# Patient Record
Sex: FEMALE | Race: BLACK OR AFRICAN AMERICAN | Employment: UNEMPLOYED | URBAN - METROPOLITAN AREA
[De-identification: names, ages, dates, MRNs, and addresses within clinical notes are randomized per-mention and may not be internally consistent; named-entity substitution may affect disease eponyms.]

---

## 2023-02-10 ENCOUNTER — CLINICAL SUPPORT (OUTPATIENT)
Dept: SMOKING CESSATION | Facility: CLINIC | Age: 58
End: 2023-02-10
Payer: MEDICAID

## 2023-02-10 DIAGNOSIS — F17.200 NICOTINE DEPENDENCE: Primary | ICD-10-CM

## 2023-02-10 PROCEDURE — 99404 PREV MED CNSL INDIV APPRX 60: CPT | Mod: S$GLB,,,

## 2023-02-10 PROCEDURE — 99404 PR PREVENT COUNSEL,INDIV,60 MIN: ICD-10-PCS | Mod: S$GLB,,,

## 2023-02-10 NOTE — PROGRESS NOTES
Patient smokes 1 ppd in her car, not her home.  She wants to be healthy and not smoke; no health issues currently. She will use 21mg patches and 2 mg lozenges.  She smoke in the middle of the night.

## 2023-02-17 ENCOUNTER — CLINICAL SUPPORT (OUTPATIENT)
Dept: SMOKING CESSATION | Facility: CLINIC | Age: 58
End: 2023-02-17
Payer: MEDICAID

## 2023-02-17 DIAGNOSIS — F17.200 NICOTINE DEPENDENCE: Primary | ICD-10-CM

## 2023-02-17 PROCEDURE — 99404 PR PREVENT COUNSEL,INDIV,60 MIN: ICD-10-PCS | Mod: S$GLB,,,

## 2023-02-17 PROCEDURE — 99404 PREV MED CNSL INDIV APPRX 60: CPT | Mod: S$GLB,,,

## 2023-02-17 RX ORDER — DM/P-EPHED/ACETAMINOPH/DOXYLAM 30-7.5/3
2 LIQUID (ML) ORAL
Qty: 168 LOZENGE | Refills: 0 | Status: SHIPPED | OUTPATIENT
Start: 2023-02-17

## 2023-02-17 RX ORDER — IBUPROFEN 200 MG
1 TABLET ORAL DAILY
Qty: 1 PATCH | Refills: 0 | Status: SHIPPED | OUTPATIENT
Start: 2023-02-17

## 2023-02-17 NOTE — PROGRESS NOTES
Individual Follow-Up Form    2/17/2023    Quit Date: TBD    Clinical Status of Patient: Outpatient    Length of Service: 60 minutes    Continuing Medication: yes  Patches or Nicotine Lozenges    Other Medications: none     Target Symptoms: Withdrawal and medication side effects. The following were  rated moderate (3) to severe (4) on TCRS:  Moderate (3): none  Severe (4): none    Comments: Pt is down to 8 cpd and feeling optimistic about quitting.  We dicussed concerns she had about fully becoming a non smoker. I commended her on doing so well in short amount of time.  The patient will continue with  therapy sessions and medication monitoring by CTTS. Prescribed medication management will be by physician.  completion of TCRS (Tobacco Cessation Rating Scale) reviewed strategies, cues, and triggers. Introduced the negative impact of tobacco on health, the health advantages of discontinuing the use of tobacco, time line improved health changes after a quit, withdrawal issues to expect from nicotine and habit, and ways to achieve the goal of a quit.  The patient denies any abnormal behavioral or mental changes at this time.       Diagnosis: F17.200    Next Visit: 1 week

## 2023-02-24 ENCOUNTER — CLINICAL SUPPORT (OUTPATIENT)
Dept: SMOKING CESSATION | Facility: CLINIC | Age: 58
End: 2023-02-24
Payer: MEDICAID

## 2023-02-24 DIAGNOSIS — F17.200 NICOTINE DEPENDENCE: Primary | ICD-10-CM

## 2023-02-24 PROCEDURE — 99404 PR PREVENT COUNSEL,INDIV,60 MIN: ICD-10-PCS | Mod: S$GLB,,,

## 2023-02-24 PROCEDURE — 99404 PREV MED CNSL INDIV APPRX 60: CPT | Mod: S$GLB,,,

## 2023-02-24 NOTE — PROGRESS NOTES
Individual Follow-Up Form    2/24/2023    Quit Date: TBD    Clinical Status of Patient: Outpatient    Length of Service: 60 minutes    Continuing Medication: yes  Patches or Nicotine Lozenges    Other Medications: none     Target Symptoms: Withdrawal and medication side effects. The following were  rated moderate (3) to severe (4) on TCRS:  Moderate (3): none  Severe (4): none    Comments: Pt is smoking btw 8-13 cpd.  We discussed increase and how she can start reducing cpd.  She will aim for a solid 10 cpd to get more control of her smoking. She is optimistic about cutting down this next week.  The patient will continue with  therapy sessions and medication monitoring by CTTS. Prescribed medication management will be by physician.  completion of TCRS (Tobacco Cessation Rating Scale) reviewed strategies, cues, and triggers. completion of TCRS (Tobacco Cessation Rating Scale) reviewed strategies, controlling environment, cues, triggers, new goals set. Introduced high risk situations with preparation interventions, caffeine similarities with withdrawal issues of habit and nicotine, Alcohol, Understanding urges, cravings, stress and relaxation. Open discussion with intervention discussion.  The patient denies any abnormal behavioral or mental changes at this time.   Diagnosis: F17.200    Next Visit: 1 week

## 2023-03-08 PROBLEM — Z12.11 COLON CANCER SCREENING: Status: ACTIVE | Noted: 2023-03-08

## 2023-03-09 ENCOUNTER — CLINICAL SUPPORT (OUTPATIENT)
Dept: SMOKING CESSATION | Facility: CLINIC | Age: 58
End: 2023-03-09
Payer: COMMERCIAL

## 2023-03-09 DIAGNOSIS — F17.200 NICOTINE DEPENDENCE: Primary | ICD-10-CM

## 2023-03-09 PROCEDURE — 99404 PREV MED CNSL INDIV APPRX 60: CPT | Mod: S$GLB,,,

## 2023-03-09 PROCEDURE — 99404 PR PREVENT COUNSEL,INDIV,60 MIN: ICD-10-PCS | Mod: S$GLB,,,

## 2023-03-09 NOTE — PROGRESS NOTES
Individual Follow-Up Form    3/9/2023    Quit Date: TBD    Clinical Status of Patient: Outpatient    Length of Service: 60 minutes    Continuing Medication: yes  Patches or Nicotine Lozenges    Other Medications: none     Target Symptoms: Withdrawal and medication side effects. The following were  rated moderate (3) to severe (4) on TCRS:  Moderate (3): none  Severe (4): none    Comments: Pt is down to 7 cpd from 10 cpd. She reports lozenges really helping her not smoke for hours and some days not using the patch and still cutting down. She is happy with her progress.  The patient will continue with  therapy sessions and medication monitoring by CTTS. Prescribed medication management will be by physician.  completion of TCRS (Tobacco Cessation Rating Scale) reviewed strategies, habitual behavior, stress, and high risk situations. Introduced stress with addition interventions, SOLVE, relaxation with interventions, nutrition, exercise, weight gain, and the importance of rewarding oneself for accomplishments toward becoming tobacco free. Open discussion of all items with interventions.   The patient denies any abnormal behavioral or mental changes at this time.       Diagnosis: F17.200    Next Visit: 2 weeks

## 2023-03-18 PROBLEM — K63.5 POLYP OF COLON: Status: ACTIVE | Noted: 2023-03-18

## 2023-04-04 ENCOUNTER — TELEPHONE (OUTPATIENT)
Dept: SMOKING CESSATION | Facility: CLINIC | Age: 58
End: 2023-04-04
Payer: MEDICAID

## 2023-05-09 ENCOUNTER — CLINICAL SUPPORT (OUTPATIENT)
Dept: SMOKING CESSATION | Facility: CLINIC | Age: 58
End: 2023-05-09

## 2023-05-09 ENCOUNTER — PATIENT MESSAGE (OUTPATIENT)
Dept: RESEARCH | Facility: HOSPITAL | Age: 58
End: 2023-05-09
Payer: MEDICAID

## 2023-05-09 DIAGNOSIS — F17.200 NICOTINE DEPENDENCE: Primary | ICD-10-CM

## 2023-05-09 PROCEDURE — 99999 PR PBB SHADOW E&M-EST. PATIENT-LVL I: ICD-10-PCS | Mod: PBBFAC,,,

## 2023-05-09 PROCEDURE — 99407 BEHAV CHNG SMOKING > 10 MIN: CPT | Mod: S$GLB,,,

## 2023-05-09 PROCEDURE — 99999 PR PBB SHADOW E&M-EST. PATIENT-LVL I: CPT | Mod: PBBFAC,,,

## 2023-05-09 PROCEDURE — 99407 PR TOBACCO USE CESSATION INTENSIVE >10 MINUTES: ICD-10-PCS | Mod: S$GLB,,,

## 2023-05-09 NOTE — PROGRESS NOTES
Spoke with patient today in regard to smoking cessation progress for 3 month telephone follow up, she states not tobacco free. Patient states having cut down on smoking and not ready to return to the program at this time. Informed patient of benefit period, future follow ups, and contact information if any further help or support is needed. Will complete smart form for 3 month follow up on Quit attempt #1.

## 2023-06-06 PROBLEM — Z91.89 AT RISK FOR OBSTRUCTIVE SLEEP APNEA: Status: ACTIVE | Noted: 2023-06-06

## 2023-06-06 PROBLEM — R06.83 SNORING: Status: ACTIVE | Noted: 2023-06-06

## 2023-06-06 PROBLEM — G47.30 SLEEP-DISORDERED BREATHING: Status: ACTIVE | Noted: 2023-06-06

## 2023-08-14 ENCOUNTER — CLINICAL SUPPORT (OUTPATIENT)
Dept: SMOKING CESSATION | Facility: CLINIC | Age: 58
End: 2023-08-14

## 2023-08-14 DIAGNOSIS — F17.200 NICOTINE DEPENDENCE: Primary | ICD-10-CM

## 2023-08-14 PROCEDURE — 99999 PR PBB SHADOW E&M-EST. PATIENT-LVL I: CPT | Mod: PBBFAC,,,

## 2023-08-14 PROCEDURE — 99407 PR TOBACCO USE CESSATION INTENSIVE >10 MINUTES: ICD-10-PCS | Mod: S$GLB,,,

## 2023-08-14 PROCEDURE — 99999 PR PBB SHADOW E&M-EST. PATIENT-LVL I: ICD-10-PCS | Mod: PBBFAC,,,

## 2023-08-14 PROCEDURE — 99407 BEHAV CHNG SMOKING > 10 MIN: CPT | Mod: S$GLB,,,

## 2023-08-14 NOTE — PROGRESS NOTES
Spoke with patient today in regard to smoking cessation progress for 6 month telephone follow up, she states not tobacco free.  Patient states not interested in returning to the program at this time.  Patient states she is smoking 5 or less cpd.  Patient states she will continue to work on it  by herself. Informed patient of benefit period, future follow up, and contact information if any further help or support is needed.  Will complete smart form for 6 month follow up on Quit attempt #1.

## 2024-02-19 ENCOUNTER — CLINICAL SUPPORT (OUTPATIENT)
Dept: SMOKING CESSATION | Facility: CLINIC | Age: 59
End: 2024-02-19
Payer: COMMERCIAL

## 2024-02-19 DIAGNOSIS — F17.200 NICOTINE DEPENDENCE, UNCOMPLICATED: Primary | ICD-10-CM

## 2024-02-19 PROCEDURE — 99999 PR PBB SHADOW E&M-EST. PATIENT-LVL I: CPT | Mod: PBBFAC,,,

## 2024-02-19 NOTE — PROGRESS NOTES
Spoke with patient today in regard to smoking cessation progress for 12 month follow up. She states she is not tobacco free. Patient got upset because she felt like she was a failure for struggling with her quit. Encouraged patient that this is an addiction and that she can rejoin the program to help her try to quit again. Patient has scheduled appointment to return to the program for Quit attempt # 2. Informed patient of benefit period, future follow ups and contact information if any further help is needed. Will resolve smart form for 12 month follow up for Quit # 1.

## 2024-02-28 ENCOUNTER — CLINICAL SUPPORT (OUTPATIENT)
Dept: SMOKING CESSATION | Facility: CLINIC | Age: 59
End: 2024-02-28
Payer: COMMERCIAL

## 2024-02-28 ENCOUNTER — TELEPHONE (OUTPATIENT)
Dept: SMOKING CESSATION | Facility: CLINIC | Age: 59
End: 2024-02-28

## 2024-02-28 DIAGNOSIS — F17.200 NICOTINE DEPENDENCE: Primary | ICD-10-CM

## 2024-02-28 PROCEDURE — 99404 PREV MED CNSL INDIV APPRX 60: CPT | Mod: S$GLB,,,

## 2024-02-28 RX ORDER — VARENICLINE TARTRATE 0.5 MG/1
TABLET, FILM COATED ORAL
Qty: 52 TABLET | Refills: 0 | Status: SHIPPED | OUTPATIENT
Start: 2024-02-28 | End: 2024-05-01 | Stop reason: SDUPTHER

## 2024-03-20 ENCOUNTER — CLINICAL SUPPORT (OUTPATIENT)
Dept: SMOKING CESSATION | Facility: CLINIC | Age: 59
End: 2024-03-20
Payer: COMMERCIAL

## 2024-03-20 DIAGNOSIS — F17.200 NICOTINE DEPENDENCE: Primary | ICD-10-CM

## 2024-03-20 NOTE — PROGRESS NOTES
Individual Follow-Up Form    3/20/2024    Quit Date: TBD    Clinical Status of Patient: Outpatient    Length of Service: 45 minutes    Continuing Medication: yes  Chantix    Other Medications: none     Target Symptoms: Withdrawal and medication side effects. The following were  rated moderate (3) to severe (4) on TCRS:  Moderate (3): none  Severe (4): none    Comments: Pt is still smoking, but less due to a recent vacation with family.  I called pharmacy because she had not received a notification it was ready. The rx was held up by a [employer] and is now resolved.  The patient is picking it up today and is excited about starting it tomorrow.  The patient will continue with  therapy sessions and medication monitoring by CTTS. Prescribed medication management will be by physician.  Completion of TCRS (Tobacco Cessation Rating Scale) reviewed strategies, cues, and triggers. Introduced the negative impact of tobacco on health, the health advantages of discontinuing the use of tobacco, time line improved health changes after a quit, withdrawal issues to expect from nicotine and habit, and ways to achieve the goal of a quit.  The patient denies any abnormal behavioral or mental changes at this time.       Diagnosis: F17.200    Next Visit: 2 weeks

## 2024-03-27 ENCOUNTER — CLINICAL SUPPORT (OUTPATIENT)
Dept: SMOKING CESSATION | Facility: CLINIC | Age: 59
End: 2024-03-27
Payer: COMMERCIAL

## 2024-03-27 DIAGNOSIS — F17.200 NICOTINE DEPENDENCE: Primary | ICD-10-CM

## 2024-03-27 NOTE — PROGRESS NOTES
Individual Follow-Up Form    3/27/2024    Quit Date: TBD    Clinical Status of Patient: Outpatient    Length of Service: 60 minutes    Continuing Medication: yes  Chantix    Other Medications: none     Target Symptoms: Withdrawal and medication side effects. The following were  rated moderate (3) to severe (4) on TCRS:  Moderate (3): none  Severe (4): none    Comments: Pt is smoking 10 cpd and is on Day 7 of Chatnix.  No side effects reported.  She is drinking water and taking pill on full stomach to avoid side effects.  She is excited about quitting smoking soon.  The patient will continue with  therapy sessions and medication monitoring by CTTS. Prescribed medication management will be by physician.  Completion of TCRS (Tobacco Cessation Rating Scale) reviewed strategies, habitual behavior, stress, and high risk situations. Introduced stress with addition interventions, SOLVE, relaxation with interventions, nutrition, exercise, weight gain, and the importance of rewarding oneself for accomplishments toward becoming tobacco free. Open discussion of all items with interventions.   The patient denies any abnormal behavioral or mental changes at this time.       Diagnosis: F17.200    Next Visit: 1 week   Awake/Alert/Cooperative

## 2024-04-03 ENCOUNTER — CLINICAL SUPPORT (OUTPATIENT)
Dept: SMOKING CESSATION | Facility: CLINIC | Age: 59
End: 2024-04-03
Payer: COMMERCIAL

## 2024-04-03 DIAGNOSIS — F17.200 NICOTINE DEPENDENCE: Primary | ICD-10-CM

## 2024-04-03 NOTE — PROGRESS NOTES
Individual Follow-Up Form    4/3/2024    Quit Date: TBD    Clinical Status of Patient: Outpatient    Length of Service: 60 minutes    Continuing Medication: yes  Chantix    Other Medications: none     Target Symptoms: Withdrawal and medication side effects. The following were  rated moderate (3) to severe (4) on TCRS:  Moderate (3): none  Severe (4): none    Comments: Pt is cutting down daily with Chantix.  She noticed a 4 cpd reduction when she started Day 10 of pills.  She is happy with speady success and is going to try and only smoke 4 cpd today and possibly quit on 4/7 as it hold a special meaning to her 3 different ways. She is happy with the way Chantix is working and has had no side effects.  The patient will continue with  therapy sessions and medication monitoring by CTTS. Prescribed medication management will be by physician.  completion of TCRS (Tobacco Cessation Rating Scale) reviewed strategies, controlling environment, cues, triggers, new goals set. Introduced high risk situations with preparation interventions, caffeine similarities with withdrawal issues of habit and nicotine, Alcohol, Understanding urges, cravings, stress and relaxation. Open discussion with intervention discussion.  The patient denies any abnormal behavioral or mental changes at this time.       Diagnosis: F17.200    Next Visit: 2 weeks

## 2024-04-17 ENCOUNTER — CLINICAL SUPPORT (OUTPATIENT)
Dept: SMOKING CESSATION | Facility: CLINIC | Age: 59
End: 2024-04-17
Payer: COMMERCIAL

## 2024-04-17 DIAGNOSIS — F17.200 NICOTINE DEPENDENCE: Primary | ICD-10-CM

## 2024-04-17 NOTE — PROGRESS NOTES
Individual Follow-Up Form    4/17/2024    Quit Date: 4/11/24    Clinical Status of Patient: Outpatient    Length of Service: 60 minutes    Continuing Medication: yes  Chantix    Other Medications: none     Target Symptoms: Withdrawal and medication side effects. The following were  rated moderate (3) to severe (4) on TCRS:  Moderate (3): none  Severe (4): none    Comments: Pt is smoke free and feeling great.  She ran out of cigarettes and decided to not go to store and buy more.  She only had one strong urge to buy some but was able to ignore it.She is excited and said that her Coke taste different now and she has cut down on them as she already needed to do.  We talked about water intake and increasing it to feel better.  She is planning on saving her cigarette money and plan a trip with her grandson.   The patient will continue with  therapy sessions and medication monitoring by CTTS. Prescribed medication management will be by physician.  Completion of TCRS (Tobacco Cessation Rating Scale) reviewed strategies, habitual behavior, stress, and high risk situations. Introduced stress with addition interventions, SOLVE, relaxation with interventions, nutrition, exercise, weight gain, and the importance of rewarding oneself for accomplishments toward becoming tobacco free. Open discussion of all items with interventions.   The patient denies any abnormal behavioral or mental changes at this time.     Diagnosis: F17.200    Next Visit: 2 weeks

## 2024-05-01 ENCOUNTER — CLINICAL SUPPORT (OUTPATIENT)
Dept: SMOKING CESSATION | Facility: CLINIC | Age: 59
End: 2024-05-01
Payer: COMMERCIAL

## 2024-05-01 DIAGNOSIS — F17.200 NICOTINE DEPENDENCE: ICD-10-CM

## 2024-05-01 RX ORDER — VARENICLINE TARTRATE 0.5 MG/1
TABLET, FILM COATED ORAL
Qty: 60 TABLET | Refills: 0 | Status: SHIPPED | OUTPATIENT
Start: 2024-05-01

## 2024-05-01 NOTE — PROGRESS NOTES
Individual Follow-Up Form    5/1/2024    Quit Date: 4/11/24    Clinical Status of Patient: Outpatient    Length of Service: 60 minutes    Continuing Medication: yes  Chantix    Other Medications: none     Target Symptoms: Withdrawal and medication side effects. The following were  rated moderate (3) to severe (4) on TCRS:  Moderate (3): none  Severe (4): none    Comments: Pt is still smoke free and having great success staying smoke free.  She stopped taking it regularly as prescribed.  I advised her to start taking it correctly and she agreed. She is 20 days smoke free and very happy about the other changes it has brought into her everyday life.  The patient will continue with  therapy sessions and medication monitoring by CTTS. Prescribed medication management will be by physician.  Completion of TCRS (Tobacco Cessation Rating Scale) reviewed strategies, habitual behavior, high risks situations, understanding urges and cravings, stress and relaxation with open discussion and additional interventions, Introduced lapses, relapses, understanding them and analyzing the situation of a lapse, conflict issues that may be linked to a lapse.   The patient denies any abnormal behavioral or mental changes at this time.       Diagnosis: F17.200    Next Visit: 2 weeks

## 2024-05-15 ENCOUNTER — CLINICAL SUPPORT (OUTPATIENT)
Dept: SMOKING CESSATION | Facility: CLINIC | Age: 59
End: 2024-05-15

## 2024-05-15 DIAGNOSIS — F17.200 NICOTINE DEPENDENCE: Primary | ICD-10-CM

## 2024-05-15 NOTE — PROGRESS NOTES
Individual Follow-Up Form    5/15/2024    Quit Date: 4/11/24    Clinical Status of Patient: Outpatient    Length of Service: 60 minutes    Continuing Medication: yes  Chantix    Other Medications: none     Target Symptoms: Withdrawal and medication side effects. The following were  rated moderate (3) to severe (4) on TCRS:  Moderate (3): none  Severe (4): none    Comments: Pt reports being smoke free and using Chantix regularly now. She has been stressed and around other smokers and does not have a craving to smoke.  She is happy with increased energy and has started waling daily.  The patient will continue with  therapy sessions and medication monitoring by CTTS. Prescribed medication management will be by physician.  The patient denies any abnormal behavioral or mental changes at this time.       Diagnosis: F17.200    Next Visit: 2 weeks

## 2024-05-29 ENCOUNTER — CLINICAL SUPPORT (OUTPATIENT)
Dept: SMOKING CESSATION | Facility: CLINIC | Age: 59
End: 2024-05-29

## 2024-05-29 DIAGNOSIS — F17.200 NICOTINE DEPENDENCE: Primary | ICD-10-CM

## 2024-05-29 NOTE — PROGRESS NOTES
Individual Follow-Up Form    2024    Quit Date: 24    Clinical Status of Patient: Outpatient    Length of Service: 60 minutes    Continuing Medication: yes  Chantix    Other Medications: none     Target Symptoms: Withdrawal and medication side effects. The following were  rated moderate (3) to severe (4) on TCRS:  Moderate (3): none  Severe (4): none    Comments: Pt is still smoke free and only had one moment of when she thought she might smoke after a  she went to.  She is confident that she will continue to be a non smoker and wished to discontinue sessions.  She will reach out to me if she needs help in abstaining from smoking cigarettes.  The patient will continue with  therapy sessions and medication monitoring by CTTS. Prescribed medication management will be by physician.  The patient denies any abnormal behavioral or mental changes at this time.     Diagnosis: F17.200

## 2024-06-18 PROBLEM — M81.8 AGE-RELATED OSTEOPOROSIS WITHOUT FRACTURE: Status: ACTIVE | Noted: 2024-06-18

## 2024-07-08 PROBLEM — K02.9 CARIES: Status: ACTIVE | Noted: 2024-07-08

## 2024-08-07 PROBLEM — G47.33 OSA (OBSTRUCTIVE SLEEP APNEA): Status: ACTIVE | Noted: 2024-08-07

## 2024-10-15 PROBLEM — M81.0 OSTEOPOROSIS: Status: ACTIVE | Noted: 2024-06-18

## 2025-01-28 ENCOUNTER — CLINICAL SUPPORT (OUTPATIENT)
Dept: NUTRITION | Facility: CLINIC | Age: 60
End: 2025-01-28
Payer: MEDICAID

## 2025-01-28 VITALS — BODY MASS INDEX: 43.75 KG/M2 | WEIGHT: 224 LBS

## 2025-01-28 DIAGNOSIS — E66.9 OBESITY: ICD-10-CM

## 2025-01-28 DIAGNOSIS — Z71.3 DIETARY COUNSELING: Primary | ICD-10-CM

## 2025-01-28 DIAGNOSIS — E66.9 OBESITY, UNSPECIFIED CLASS, UNSPECIFIED OBESITY TYPE, UNSPECIFIED WHETHER SERIOUS COMORBIDITY PRESENT: ICD-10-CM

## 2025-01-28 DIAGNOSIS — E66.01 CLASS 3 SEVERE OBESITY WITH BODY MASS INDEX (BMI) OF 40.0 TO 44.9 IN ADULT, UNSPECIFIED OBESITY TYPE, UNSPECIFIED WHETHER SERIOUS COMORBIDITY PRESENT: ICD-10-CM

## 2025-01-28 DIAGNOSIS — E66.813 CLASS 3 SEVERE OBESITY WITH BODY MASS INDEX (BMI) OF 40.0 TO 44.9 IN ADULT, UNSPECIFIED OBESITY TYPE, UNSPECIFIED WHETHER SERIOUS COMORBIDITY PRESENT: ICD-10-CM

## 2025-01-28 PROCEDURE — 97802 MEDICAL NUTRITION INDIV IN: CPT | Mod: 95,,, | Performed by: DIETITIAN, REGISTERED

## 2025-01-28 NOTE — PATIENT INSTRUCTIONS
Name: Karina Randolph   Date: 01/28/2025    Recommended daily energy requirements to reach your goals:  Calories: [id number]  Protein: 160-170 gm  Carbohydrates:  gm  Total Fat: 90 gm  Baseline for fluids: 100 ml fl oz (12 cups) or per MD montemayor's    Recommendations & Goals:  Patient goals and recommendations are tailored to the specific patient's needs, readiness to change, lifestyle, culture, skills, resources, & abilities. Strategies to help achieve these nutrition-related goals were discussed which can include but are not limited to SMART goal setting & mindful eating.     Aim for a minimum of 7 hours sleep   Exercise 60 minutes most days  Eat breakfast within 1-2 hours of waking up  Try not to skip any meals or snacks, not going more than 3-4 hours without eating   At each meal and snack, try to include a source of fiber + lean protein + healthy fat     Action Items:   - drink 32 oz water / day  - limit to 1 soda / day

## 2025-01-28 NOTE — PROGRESS NOTES
Nutrition Assessment    Visit Type: Insurance initial  Session Time:  1 Hour 30 Minutes  Reason for MNT visit: Pt in for education and nutrition counseling regarding Obesity and hyperlipidemia  .       Age: 59 y.o.  Wt:   Wt Readings from Last 1 Encounters:   25 101.6 kg (224 lb)     Ht:   Ht Readings from Last 1 Encounters:   25 5' (1.524 m)     BMI:   BMI Readings from Last 1 Encounters:   25 43.75 kg/m²       Client states:    Had gastric sleeve in 2014, she got to around 136 lb and maintained it for years. Currently weighs more than she did at the time of her bariatric surgery, or when she was 9 months pregnant.   She stopped smoking 2023, family history of cancer she wants to avoid that as much as she can.   Cut out chips and reduced soda. She may drink soda 1-2 can / day. Got a 64 oz bottle for water, is making efforts to increase water intake. She used to eat Doritos and Coke for a 'meal'. She moved to [place] from [place], she use to wake up 5:30 am and get up and walk for 30 min M-. For walking around her neighborhood she is very nervous to come in contact with snakes or alligators. Her area does not have side walks consistently.   Karina notes she has eaten more sausage and eggs than since she moved here. She's trying to eat more fruit.   Her weight is making her unhappy, uncomfortable.   She used to work in mortgages,     Medical History  Problem List             Resolved    Colon cancer screening         Polyp of colon         Sleep-disordered breathing         Snoring         At risk for obstructive sleep apnea         Osteoporosis         Caries         PEPE (obstructive sleep apnea)            No past medical history on file.    Past Surgical History:   Procedure Laterality Date     SECTION      GASTRIC RESTRICTION SURGERY      POLYSOMNOGRAPHY 4 OR MORE PARAMETERS  2024          Medications    Prior to Admission medications    Medication Sig Start Date  End Date Taking? Authorizing Provider   alendronate (FOSAMAX) 70 MG tablet Take 1 tablet (70 mg total) by mouth every 7 days.  Patient not taking: Reported on 2025  Deloris Wesley MD   atorvastatin (LIPITOR) 20 MG tablet Take 1 tablet (20 mg total) by mouth every evening. 25  Nati Mckeno MD   EScitalopram oxalate (LEXAPRO) 10 MG tablet Take 1 tablet (10 mg total) by mouth once daily.  Patient not taking: Reported on 2024 1/30/23 3/31/23  Rocco Keller DO   ibuprofen (ADVIL,MOTRIN) 800 MG tablet Take 1 tablet (800 mg total) by mouth 3 (three) times daily.  Patient not taking: Reported on 2025 10/15/24   Inés Morris MD   metFORMIN (GLUCOPHAGE-XR) 500 MG ER 24hr tablet Take 1 tablet (500 mg total) by mouth daily with breakfast. 25  Nati Mckeon MD   traZODone (DESYREL) 50 MG tablet TAKE 3 TABLETS BY MOUTH NIGHTLY AS NEEDED FOR INSOMNIA 25   Nati Mckeon MD        Vitamins, Minerals, and/or Supplements:  probiotics, omega 3 fish oil, B12, D3, multi, calcium, biotin     Food Allergies or Intolerances:  none, dislikes any seafood     Social History    Marital status:     and client    Social History     Tobacco Use    Smoking status: Former     Current packs/day: 0.00     Average packs/day: 1 pack/day for 10.4 years (10.4 ttl pk-yrs)     Types: Cigarettes     Start date: 2023     Quit date: 2024     Years since quittin.8    Smokeless tobacco: Never   Substance Use Topics    Alcohol use: Yes     Alcohol/week: 2.0 standard drinks of alcohol     Types: 2 Cans of beer per week     Comment: social     Current Alcohol use: rarely drink at all, a celebration sip or two    Lab Reports:  Reviewed and noted     Lab Results   Component Value Date    PKPJZQXH24VJ 45 2024    TSH 0.586 2024    FREET4 0.97 2023    AST 18 2024    ALT 14 2024    GLUCOSE 98 2024    HDL  43 09/30/2024    LDLCALC 184.0 (H) 01/30/2023    TRIG 311 (H) 09/30/2024    HGBA1C 5.8 (H) 09/30/2024    HGBA1C 5.8 (H) 01/30/2023         BP Readings from Last 1 Encounters:   01/06/25 135/77        24-hour Recall:     4:30- 5 am: coffee (1 cup), trying to drink a bottle of water    9:00 am : (prior to this last week- 2 eggs, 2 sausage patties) this past week- oatmeal, fresh fruit or just fresh fruit, recently bought some 'keto bread' and 'keto thin bagels' w/ whiipped sour cream & chive cream cheese, orange juice this morning  *use minute oats (plain) + PB2 powder or protein powders    12 -1 :00 pm: can of progresso soup  *sandwich / wrap / salad w/ protein ie chicken salad or tortilla with plant based meat sub    Snack: fresh fruit (cut out chips and cookies)  *3-4 pm always snack: 1-2 servings or carb + 1-2 serving protein or protein shake (likes Equate)     Dinner 6-6:30 pm: pork chops, mashed potatoes, green beans, fettuccini with sauce, wild rice soup / beef lasagna / chicken / vegetables: cucumbers, lettuce, carrots, broccoli, cauliflower (no brussel sprouts), spinach and other greens  *portion control, using food scale    Was eating fried chicken often, has stopped, 2 weeks since she had any    Svetlana: 1 soda at dinner, flavor packet in water     Food choices (After Midnight)  Patient eating midnight to 4am: (Patient-Rptd) (P) Once or twice a month   Home cooked meals (Midnight - 4am): (Patient-Rptd) (P) Prepared by myself   Fast food meals (Midnight - 4am): (Patient-Rptd) (P) Burgers   Snacks (Midnight - 4am): (Patient-Rptd) (P) Chips      Food choices (Early Morning)  Patient eats 4am to 8am: (Patient-Rptd) (P) Never                  Food choices (Morning)  Patient eats 8am to noon: (Patient-Rptd) (P) Every day   Home cooked meals (8am - noon): (Patient-Rptd) (P) Bteakfast foods   Fast food meals (8am - midnight): (Patient-Rptd) (P) Burgers   Snacks (8am - noon): (Patient-Rptd) (P) Chips     Food choices  (Afternoon)  Patient eats noon to 4pm: (Patient-Rptd) (P) Every day   Home cooked meals (Noon - 4pm): (Patient-Rptd) (P) Lunch foods   Snacks (Noon to 4pm): (Patient-Rptd) (P) Chips     Food choices (Evening)   Patient eats 4pm to 8pm: (Patient-Rptd) (P) Every day   Home cooked meals (4pm - 8pm): (Patient-Rptd) (P) Dinner foods   Fast food meals (4pm - 8pm): (Patient-Rptd) (P) Burgers   Snacks (4pm - 8pm): (Patient-Rptd) (P) Chips     Food choices (Late evening)  R [facility] PEQ NUTRITION HOW OFTEN EATING LATE EVENING: (Patient-Rptd) (P) Once or twice a week   Home cooked meals (8pm - midnight): (Patient-Rptd) (P) Variety   Fast food meals (8pm - midnight): (Patient-Rptd) (P) Burgers, chicken   Snacks (8pm - midnight: (Patient-Rptd) (P) Chips, cookies, etc      Beverages:  How much water consumed (per day?): (Patient-Rptd) (P) 1   Cups of milk consumed (per day?): (Patient-Rptd) (P) 0       Cups of  juice consumed (per day?): (Patient-Rptd) (P) 0   Number of supplement shakes (per day?): (Patient-Rptd) (P) 0       Number of cups of coffee (per day?): (Patient-Rptd) (P) 1   Coffee: (Patient-Rptd) (P) Decaf, Flavorings (Vanilla, Mocha, Caramel etc.)   Tea:: (Patient-Rptd) (P) I do not drink tea   Cups of soda consumed (per day?): (Patient-Rptd) (P) 3   Other non-alcoholic beverages consumed (per day?): (Patient-Rptd) (P) 0          LIFESTYLE FACTORS    Dinning out: 1-2 x per week (trying to cut out those out completely)    Meal preparation/shopping: Who does the grocery shopping:: (Patient-Rptd) (P) Me Who prepares the meals: (Patient-Rptd) (P) Me Client    Sleep: poor (falls asleep, wakes up and has a hard time falling back to sleep) she was eating in the middle of the night when she would wake up, has stopped doing that    Stress Level: moderate    Support System:  spouse and nieces and nephews and friend     Exercise Regimen: Sedentary (little or no exercise)  *downloaded chair yoga radha, going look into a track at a near  by school, there is a walking trail not far - needs her  to go with her    Diagnosis    Excessive energy intake related to lack of adherence to previously given diet guidance as evidenced by BMI 43.75, diet recall.      Intervention    Estimated Energy Requirements:   Calories: 6283-7837  Protein: 160-170 gm  Carbohydrates:  gm  Total Fat: 90 gm  Baseline for fluids: 100 ml fl oz (12 cups) or per MD rec's    Recommendations & Goals:  Patient goals and recommendations are tailored to the specific patient's needs, readiness to change, lifestyle, culture, skills, resources, & abilities. Strategies to help achieve these nutrition-related goals were discussed which can include but are not limited to SMART goal setting & mindful eating.     Aim for a minimum of 7 hours sleep   Exercise 60 minutes most days  Eat breakfast within 1-2 hours of waking up  Try not to skip any meals or snacks, not going more than 3-4 hours without eating   At each meal and snack, try to include a source of fiber + lean protein + healthy fat     Written Materials Provided  These resources are intended to assist the patient in making it easier to choose recommended options when eating out & to identify better-for-you brands at the grocery store:    Meal Planning Guide with recommendations discussed along with portion sizes and a customized meal plan   Fueling Well On-the-Go Food Guide  Eat Fit Shopping Guide  Lifestyle Nutrition Meal Guide  RD contact information    Action Items:   - drink 32 oz water / day  - limit to 1 soda / day      Monitoring/Evaluation    Monitor the following:  Weight  Sleep  Stress Management  Movement  Nutrient intake in reference to meal plan    Communicated with healthcare provider and documented plan for referral to appropriate agency/healthcare provider as needed    Supervising Physician: Miguel Bustos MD    Patient motivation, anticipated barriers, expected compliance: Patient is motivated and has  verbalized understanding and intent to comply.     Comprehension: good     Follow-up: 6-8 weeks, virtual

## 2025-02-19 ENCOUNTER — TELEPHONE (OUTPATIENT)
Dept: SMOKING CESSATION | Facility: CLINIC | Age: 60
End: 2025-02-19
Payer: MEDICAID